# Patient Record
Sex: MALE | Race: WHITE | NOT HISPANIC OR LATINO
[De-identification: names, ages, dates, MRNs, and addresses within clinical notes are randomized per-mention and may not be internally consistent; named-entity substitution may affect disease eponyms.]

---

## 2017-08-29 ENCOUNTER — RECORDS - HEALTHEAST (OUTPATIENT)
Dept: ADMINISTRATIVE | Facility: OTHER | Age: 67
End: 2017-08-29

## 2017-12-14 ENCOUNTER — HOSPITAL ENCOUNTER (OUTPATIENT)
Dept: CT IMAGING | Facility: CLINIC | Age: 67
Discharge: HOME | End: 2017-12-14
Attending: INTERNAL MEDICINE

## 2017-12-14 DIAGNOSIS — C34.90 LUNG CANCER (H): ICD-10-CM

## 2018-10-01 ENCOUNTER — RECORDS - HEALTHEAST (OUTPATIENT)
Dept: ADMINISTRATIVE | Facility: OTHER | Age: 68
End: 2018-10-01

## 2018-10-02 ENCOUNTER — RECORDS - HEALTHEAST (OUTPATIENT)
Dept: ADMINISTRATIVE | Facility: OTHER | Age: 68
End: 2018-10-02

## 2018-12-26 ENCOUNTER — HOSPITAL ENCOUNTER (OUTPATIENT)
Dept: CT IMAGING | Facility: HOSPITAL | Age: 68
Discharge: HOME | End: 2018-12-26
Attending: INTERNAL MEDICINE

## 2018-12-26 DIAGNOSIS — C34.12 MALIGNANT NEOPLASM OF UPPER LOBE BRONCHUS, LEFT (H): ICD-10-CM

## 2018-12-26 DIAGNOSIS — Z87.891 PERSONAL HISTORY OF TOBACCO USE, PRESENTING HAZARDS TO HEALTH: ICD-10-CM

## 2018-12-26 DIAGNOSIS — C34.90 NON-SMALL CELL LUNG CANCER (H): ICD-10-CM

## 2018-12-28 ENCOUNTER — RECORDS - HEALTHEAST (OUTPATIENT)
Dept: ADMINISTRATIVE | Facility: OTHER | Age: 68
End: 2018-12-28

## 2019-12-31 ENCOUNTER — COMMUNICATION - HEALTHEAST (OUTPATIENT)
Dept: PULMONOLOGY | Facility: OTHER | Age: 69
End: 2019-12-31

## 2020-01-29 ENCOUNTER — COMMUNICATION - HEALTHEAST (OUTPATIENT)
Dept: PULMONOLOGY | Facility: OTHER | Age: 70
End: 2020-01-29

## 2020-06-30 ENCOUNTER — COMMUNICATION - HEALTHEAST (OUTPATIENT)
Dept: PULMONOLOGY | Facility: OTHER | Age: 70
End: 2020-06-30

## 2021-06-05 ENCOUNTER — RECORDS - HEALTHEAST (OUTPATIENT)
Dept: SCHEDULING | Facility: CLINIC | Age: 71
End: 2021-06-05

## 2021-06-05 DIAGNOSIS — C34.90 MALIGNANT NEOPLASM OF BRONCHUS AND LUNG (H): ICD-10-CM

## 2021-06-05 NOTE — TELEPHONE ENCOUNTER
Left voicemail for patient, reminding that he is due for his annual LDCT. Instructed to schedule an OV with PCP so screening and scan can be done. Provided call back information for further needs or questions.

## 2021-06-20 NOTE — LETTER
Letter by Addis Key RN at      Author: Addis Key RN Service: -- Author Type: --    Filed:  Encounter Date: 12/31/2019 Status: Signed         Macho Angeles  [address] [place] [id number]        December 31, 2019     RE: Reminder about lung cancer screening     Dear Macho,     Its time for your yearly exam to check for lung cancer.   Please call your primary care doctor to schedule a brief visit so your Low Dose CT scan can be ordered at the time of that appointment. Your scan needs to be done within 30 days of your office visit.     You should have a yearly exam if:  Youre age 55 to 80 (55 to 77, if youre on Medicare)  Youve smoked a pack a day for at least 30 years (or 2 packs a day for at least 15 years)  If you no longer smoke, its been less than 15 years since you quit     These exams work best when done every year. They are good at finding lung cancer early, but they cant find all lung cancers. If you develop any symptoms (shortness of breath, chest pain, coughing up blood), call your doctor right away.     If you would like help to quit smoking, please talk with your doctor during your next visit. Or, call [employer] at [phone].        We look forward to seeing you soon.      Sincerely,     [facility] [employer])  Lung Cancer Screening Program

## 2021-06-20 NOTE — LETTER
Letter by Addis Key RN at      Author: Addis Key RN Service: -- Author Type: --    Filed:  Encounter Date: 6/30/2020 Status: (Other)         Macho Angeles  [address] [place] [id number]        June 30, 2020     RE: Reminder about lung cancer screening       Dear Macho,    Your annual lung cancer screening scan is overdue. We have attempted to reach you via mail and phone without success. If you remain interested please call your primary care provider to schedule an office visit so they can do the screening.  We will make no further attempts to call you to schedule at this point if we do not hear from you and assume you are no longer interested in the screening.     Sincerely,  [facility] ([facility] Lung [facility] Screening Program

## 2021-06-27 ENCOUNTER — HEALTH MAINTENANCE LETTER (OUTPATIENT)
Age: 71
End: 2021-06-27

## 2021-10-17 ENCOUNTER — HEALTH MAINTENANCE LETTER (OUTPATIENT)
Age: 71
End: 2021-10-17

## 2022-07-23 ENCOUNTER — HEALTH MAINTENANCE LETTER (OUTPATIENT)
Age: 72
End: 2022-07-23

## 2022-10-01 ENCOUNTER — HEALTH MAINTENANCE LETTER (OUTPATIENT)
Age: 72
End: 2022-10-01

## 2023-08-12 ENCOUNTER — HEALTH MAINTENANCE LETTER (OUTPATIENT)
Age: 73
End: 2023-08-12